# Patient Record
Sex: FEMALE | Race: WHITE | NOT HISPANIC OR LATINO | URBAN - METROPOLITAN AREA
[De-identification: names, ages, dates, MRNs, and addresses within clinical notes are randomized per-mention and may not be internally consistent; named-entity substitution may affect disease eponyms.]

---

## 2024-11-21 ENCOUNTER — OFFICE VISIT (OUTPATIENT)
Dept: URBAN - METROPOLITAN AREA CLINIC 96 | Facility: CLINIC | Age: 39
End: 2024-11-21
Payer: COMMERCIAL

## 2024-11-21 ENCOUNTER — DASHBOARD ENCOUNTERS (OUTPATIENT)
Age: 39
End: 2024-11-21

## 2024-11-21 VITALS
TEMPERATURE: 98.1 F | DIASTOLIC BLOOD PRESSURE: 80 MMHG | RESPIRATION RATE: 18 BRPM | HEIGHT: 61 IN | BODY MASS INDEX: 21.34 KG/M2 | SYSTOLIC BLOOD PRESSURE: 104 MMHG | HEART RATE: 83 BPM | WEIGHT: 113 LBS

## 2024-11-21 DIAGNOSIS — R19.7 ACUTE DIARRHEA: ICD-10-CM

## 2024-11-21 DIAGNOSIS — A04.72 C. DIFFICILE COLITIS: ICD-10-CM

## 2024-11-21 PROBLEM — 409966000: Status: ACTIVE | Noted: 2024-11-21

## 2024-11-21 PROBLEM — 423590009: Status: ACTIVE | Noted: 2024-11-21

## 2024-11-21 PROCEDURE — 99204 OFFICE O/P NEW MOD 45 MIN: CPT

## 2024-11-21 NOTE — HPI-TODAY'S VISIT:
39-year-old female with past medical history of endometriosis presents for evaluation of C. difficile diarrhea. Referred by a friend who works with [employer] at [facility]. She was admitted to [facility] 11/4/2024 for elective surgery for endometriosis removal, appendectomy, and right oophorectomy. A few days after discharge she began to notice watery diarrhea with mucus that smelled like C. difficile. 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 Her surgeon ordered stool studies which confirmed C. difficile infection. She is 5 days into a 10-day course of vancomycin 4 times daily. Stools are now formed. Denies nausea, vomiting, abdominal pain, overt blood in stool, melena.

## 2024-12-02 ENCOUNTER — OFFICE VISIT (OUTPATIENT)
Dept: URBAN - METROPOLITAN AREA CLINIC 96 | Facility: CLINIC | Age: 39
End: 2024-12-02